# Patient Record
(demographics unavailable — no encounter records)

---

## 2025-03-22 NOTE — REVIEW OF SYSTEMS
[Feeling Fatigued] : feeling fatigued [Joint Pain] : joint pain [Anxiety] : anxiety [Negative] : Heme/Lymph [FreeTextEntry3] : glasses [FreeTextEntry5] : se history of present illness

## 2025-03-22 NOTE — HISTORY OF PRESENT ILLNESS
[FreeTextEntry1] : 69-year-old female Cardiology consultation requested because of multiple risk factors for atherosclerotic heart disease.  Erik has no known heart disease.  There is no history of myocardial infarction angina or congestive heart failure.  She previously has undergone multiple noninvasive studies including the following    normal exercise treadmill ECG study  echocardiogram mild mitral regurgitation.  Left ventricular ejection fraction greater than 55%  coronary calcium score 0  carotid ultrasound study mild-moderate soft plaque left carotid artery right carotid artery no significant atherosclerotic plaque.  Erik denies  symptoms of chest pain palpitations shortness of breath or syncope . She is physically active without restriction or limitation.  There is a prior history of hyperlipidemia.  There is no history of hypertension or diabetes.  Her grandparents had myocardial infarctions.  Her mother  suddenly at age 44 thought  to be related to an arrhythmia . An  autopsy failed to demonstrate any specific cardiac abnormality  Erik smoked only briefly in college but not since that time.    Mrs. Tal Jewell    presents today for cardiovascular evaluation.

## 2025-03-22 NOTE — PHYSICAL EXAM
[Normal Conjunctiva] : normal conjunctiva [Normal S1, S2] : normal S1, S2 [Clear Lung Fields] : clear lung fields [Soft] : abdomen soft [Non Tender] : non-tender [Normal Bowel Sounds] : normal bowel sounds [Normal Gait] : normal gait [No Rash] : no rash [No Focal Deficits] : no focal deficits [de-identified] : Appears fit in no distress lying flat [de-identified] : No neck vein distention.  No carotid bruit [de-identified] : No murmur no gallop.  No diastolic sounds.  Point of maximal impulse normal and not displaced [de-identified] : Full range of motion [de-identified] : No peripheral edema dorsalis pedis pulses +2 bilaterally.  Feet warm and well-perfused.  No ulcerations [de-identified] : pleasant

## 2025-03-22 NOTE — PHYSICAL EXAM
[Normal Conjunctiva] : normal conjunctiva [Normal S1, S2] : normal S1, S2 [Clear Lung Fields] : clear lung fields [Soft] : abdomen soft [Non Tender] : non-tender [Normal Bowel Sounds] : normal bowel sounds [Normal Gait] : normal gait [No Rash] : no rash [No Focal Deficits] : no focal deficits [de-identified] : Appears fit in no distress lying flat [de-identified] : No neck vein distention.  No carotid bruit [de-identified] : No murmur no gallop.  No diastolic sounds.  Point of maximal impulse normal and not displaced [de-identified] : Full range of motion [de-identified] : No peripheral edema dorsalis pedis pulses +2 bilaterally.  Feet warm and well-perfused.  No ulcerations [de-identified] : pleasant

## 2025-03-22 NOTE — DISCUSSION/SUMMARY
[FreeTextEntry1] : Multiple risk factors for atherosclerotic heart disease Erik has multiple risk factors for atherosclerotic heart disease including a family history age and hyperlipidemia.  However there is no evidence of such to date.  Previous evaluation has included the followin/22 normal exercise treadmill ECG study  echocardiogram mild mitral regurgitation.  Left ventricular ejection fraction greater than 55%  echocardiogram mild mitral regurgitation small pericardial effusion left ventricular ejection fraction greater than 55%  coronary calcium score 0  carotid ultrasound study mild-moderate soft plaque left carotid artery right carotid artery no significant atherosclerotic plaque. There are no symptoms of angina and the resting 3/25 electrocardiogram shows no evidence of myocardial ischemia or infarction.  Measures to control modifiable risk factors for the development of atherosclerotic disease will be important in long-term management.  I have recommended the following: Risk factor modification as discussed above No further cardiac testing for this problem at this time    Hyperlipidemia Hyperlipidemia represents a risk factor for atherosclerotic heart disease.  The target LDL level for primary prevention is  less than 100.  In  the serum cholesterol level was 224 HDL 73  and triglycerides 94.  The ability of high HDL levels to provide protection against cardiovascular events is controversial.  Erik has been reluctant to take [employer] a reductase inhibitor therapy for fear of adverse effects.  In my judgment the risk of [employer] a reductase inhibitor therapy outweighs any potential benefit at this time. Nonpharmacological therapy, specifically diet and exercise are emphasized as major aspects of treatment.  I have recommended the following: Low-fat low-cholesterol heart healthy diet.  Regular aerobic exercise Target LDL level to less than 100 as discussed above Continue the present medical regimen    The diagnosis, prognosis, risks, options and alternatives were explained at length to the patient.  All questions were answered.  Issues discussed included hyperlipidemia antihyperlipidemic medical therapy atherosclerotic heart disease noninvasive cardiac testing diet and exercise